# Patient Record
Sex: FEMALE
[De-identification: names, ages, dates, MRNs, and addresses within clinical notes are randomized per-mention and may not be internally consistent; named-entity substitution may affect disease eponyms.]

---

## 2021-03-12 ENCOUNTER — HOSPITAL ENCOUNTER (EMERGENCY)
Dept: HOSPITAL 97 - ER | Age: 26
Discharge: HOME | End: 2021-03-12
Payer: SELF-PAY

## 2021-03-12 VITALS — SYSTOLIC BLOOD PRESSURE: 120 MMHG | DIASTOLIC BLOOD PRESSURE: 78 MMHG

## 2021-03-12 VITALS — OXYGEN SATURATION: 98 % | TEMPERATURE: 98.5 F

## 2021-03-12 DIAGNOSIS — F17.210: ICD-10-CM

## 2021-03-12 DIAGNOSIS — F41.9: ICD-10-CM

## 2021-03-12 DIAGNOSIS — K29.70: Primary | ICD-10-CM

## 2021-03-12 LAB
ALBUMIN SERPL BCP-MCNC: 4.5 G/DL (ref 3.4–5)
ALP SERPL-CCNC: 82 U/L (ref 45–117)
ALT SERPL W P-5'-P-CCNC: 20 U/L (ref 12–78)
AST SERPL W P-5'-P-CCNC: 22 U/L (ref 15–37)
BUN BLD-MCNC: 14 MG/DL (ref 7–18)
GLUCOSE SERPLBLD-MCNC: 76 MG/DL (ref 74–106)
HCT VFR BLD CALC: 38.9 % (ref 36–45)
LIPASE SERPL-CCNC: 57 U/L (ref 73–393)
LYMPHOCYTES # SPEC AUTO: 1.5 K/UL (ref 0.7–4.9)
PMV BLD: 8 FL (ref 7.6–11.3)
POTASSIUM SERPL-SCNC: 3.8 MMOL/L (ref 3.5–5.1)
RBC # BLD: 4.46 M/UL (ref 3.86–4.86)

## 2021-03-12 PROCEDURE — 80076 HEPATIC FUNCTION PANEL: CPT

## 2021-03-12 PROCEDURE — 99284 EMERGENCY DEPT VISIT MOD MDM: CPT

## 2021-03-12 PROCEDURE — 80048 BASIC METABOLIC PNL TOTAL CA: CPT

## 2021-03-12 PROCEDURE — 83690 ASSAY OF LIPASE: CPT

## 2021-03-12 PROCEDURE — 36415 COLL VENOUS BLD VENIPUNCTURE: CPT

## 2021-03-12 PROCEDURE — 85025 COMPLETE CBC W/AUTO DIFF WBC: CPT

## 2021-03-12 NOTE — ER
Nurse's Notes                                                                                     

 [facility]                                                                 

Name: Sierra Doss                                                                                

Age: 25 yrs                                                                                       

Sex: Female                                                                                       

: 1995                                                                                   

MRN: [id number]                                                                                   

Arrival Date: 2021                                                                          

Time: 19:45                                                                                       

Account#: [id number]                                                                            

Bed 16                                                                                            

Private MD:                                                                                       

Diagnosis: Nausea and vomiting;Anxiety disorder, unspecified;Gastritis                            

                                                                                                  

Presentation:                                                                                     

                                                                                             

19:48 Coronavirus screen: Client denies travel out of the [place] in the last 14 days. At this   ll1 

      time, the client does not indicate any symptoms associated with coronavirus-19. Ebola       

      Screen: Patient denies travel to an Ebola-affected area in the 21 days before illness       

      onset. Initial Sepsis Screen: Does the patient meet any 2 criteria? No. Patient's           

      initial sepsis screen is negative. Does the patient have a suspected source of              

      infection? Yes: Acute abdominal pain. Risk Assessment: Do you want to hurt yourself or      

      someone else? Patient reports no desire to harm self or others. Onset of symptoms was       

      2021.                                                                             

19:48 Method Of Arrival: Ambulatory                                                           ll1 

19:48 Acuity: VICKY 2                                                                           ll1 

19:51 Chief complaint: Patient states: Sever abd pain with N/V for 1 day. Vomiting bright and ll1 

      dark red blood today.                                                                       

                                                                                                  

Historical:                                                                                       

- Allergies:                                                                                      

19:51 PENICILLINS;                                                                            ll1 

- PMHx:                                                                                           

19:51 chronic gastroenteritis; Seizures;                                                      ll1 

                                                                                                  

- Immunization history:: Flu vaccine is not up to date.                                           

- Social history:: Smoking status: Patient reports the use of cigarette tobacco                   

  products, smokes one pack cigarettes per day.                                                   

                                                                                                  

                                                                                                  

Screenin:00 Abuse screen: Denies threats or abuse. Nutritional screening: No deficits noted.        jb4 

      Tuberculosis screening: No symptoms or risk factors identified. Fall Risk None              

      identified.                                                                                 

                                                                                                  

Assessment:                                                                                       

20:00 General: Appears in no apparent distress. uncomfortable, Behavior is calm, cooperative, jb4 

      appropriate for age. Pain: Complains of pain in abdomen Pain does not radiate. Pain         

      currently is 8 out of 10 on a pain scale. Neuro: Level of Consciousness is awake,           

      alert, obeys commands, Oriented to person, place, time, situation. Cardiovascular:          

      Patient's skin is warm and dry. Respiratory: Airway is patent Respiratory effort is         

      even, unlabored, Respiratory pattern is regular, symmetrical. GI: Abdomen is flat,          

      non-distended. : No signs and/or symptoms were reported regarding the genitourinary       

      system. EENT: No signs and/or symptoms were reported regarding the EENT system. Derm:       

      Skin is intact, Skin is pink, warm \T\ dry. Musculoskeletal: Circulation, motion, and       

      sensation intact. Range of motion: intact in all extremities.                               

21:00 Reassessment: Patient appears in no apparent distress at this time. Patient and/or      jb4 

      family updated on plan of care and expected duration. Pain level reassessed. Patient is     

      alert, oriented x 3, equal unlabored respirations, skin warm/dry/pink.                      

22:00 Reassessment: Patient appears in no apparent distress at this time. Patient and/or      jb4 

      family updated on plan of care and expected duration. Pain level reassessed. Patient is     

      alert, oriented x 3, equal unlabored respirations, skin warm/dry/pink.                      

                                                                                                  

Vital Signs:                                                                                      

19:48 Pulse 105; Resp 17; Temp 98.5; Pulse Ox 98% ; Weight 65.77 kg; Height 5 ft. 8 in.       ll1 

      (172.72 cm); Pain 8/10;                                                                     

19:51  / 112;                                                                           ll1 

22:00  / 78; Pulse 95; Resp 18; Pulse Ox 98% on R/A; Pain 2/10;                         jb4 

19:48 Body Mass Index 22.05 (65.77 kg, 172.72 cm)                                             ll1 

                                                                                                  

ED Course:                                                                                        

19:45 Patient arrived in ED.                                                                  mr  

19:50 Triage completed.                                                                       ll1 

19:51 Arm band placed on.                                                                     ll1 

19:54 [facility]                                                               [id number] 

19:54 Harinder Santiago MD is Attending Physician.                                                [id number] 

20:00 Patient has correct armband on for positive identification. Bed in low position. Call   jb4 

      light in reach. Side rails up X 1. Pulse ox on. NIBP on.                                    

20:37 Wero, Tony, RN is Primary Nurse.                                                     jb4 

21:00 Initial lab(s) drawn, by me, sent to lab. Inserted saline lock: 18 gauge in left        jb4 

      antecubital area, using aseptic technique. Blood collected.                                 

22:47 No provider procedures requiring assistance completed. IV discontinued, intact,         jb4 

      bleeding controlled, No redness/swelling at site.                                           

                                                                                                  

Administered Medications:                                                                         

21:05 Drug: NS 0.9% 1000 ml Route: IV; Rate: 1000 ml; Site: left antecubital;                 jb4 

22:00 Follow up: Response: No adverse reaction; IV Status: Completed infusion; IV Intake:     jb4 

      1000ml                                                                                      

21:08 Drug: Promethazine 12.5 mg Route: IVP; Site: left antecubital;                          jb4 

21:30 Follow up: Response: No adverse reaction; Marked relief of symptoms                     jb4 

21:12 Drug: ProTONIX 40 mg Route: IVP; Site: left femoral;                                    jb4 

21:40 Follow up: Response: No adverse reaction                                                jb4 

21:13 Drug: Ativan 0.5 mg Route: IVP; Site: left antecubital;                                 jb4 

21:45 Follow up: Response: No adverse reaction; Marked relief of symptoms                     jb4 

                                                                                                  

                                                                                                  

Intake:                                                                                           

22:00 IV: 1000ml; Total: 1000ml.                                                              jb4 

                                                                                                  

Outcome:                                                                                          

22:20 Discharge ordered by MD.                                                                samir 

22:47 Discharged to home ambulatory.                                                          jb4 

22:47 Condition: stable                                                                           

22:47 Discharge instructions given to patient, Instructed on discharge instructions, follow       

      up and referral plans. medication usage, Demonstrated understanding of instructions,        

      follow-up care, medications, Prescriptions given X 1.                                       

22:49 Patient left the ED.                                                                    jb4 

                                                                                                  

Signatures:                                                                                       

Weiss, Luz Marina                                 mr                                                   

Arnoldo Collazo PA PA   jr8                                                  

Tony Conteh RN                       RN   jb4                                                  

Augusta Mendoza RN                       RN   ll1                                                  

                                                                                                  

Corrections: (The following items were deleted from the chart)                                    

19:52 19:48 Acuity: VICKY 3 ll1                                                                 ll1 

                                                                                                  

**************************************************************************************************

## 2021-03-12 NOTE — EDPHYS
Physician Documentation                                                                           

 [facility]                                                                 

Name: Sierra Doss                                                                                

Age: 25 yrs                                                                                       

Sex: Female                                                                                       

: 1995                                                                                   

MRN: [id number]                                                                                   

Arrival Date: 2021                                                                          

Time: 19:45                                                                                       

Account#: [id number]                                                                            

Bed 16                                                                                            

Private MD:                                                                                       

ED Physician Harinder Santiago                                                                         

HPI:                                                                                              

                                                                                             

20:07 This 25 yrs old  Female presents to ER via Ambulatory with complaints of        jr8 

      Abdominal Pain, Vomiting.                                                                   

20:07 The patient presents with abdominal pain in the epigastric area. Onset: The             jr8 

      symptoms/episode began/occurred today. Associated signs and symptoms: Pertinent             

      positives: vomiting. Patient reports epigastric pain and vomiting that started today at     

      5pm. PMHx: chronic gastroenteritis and anxiety. She reports having had similar episodes     

      before that were treated with Phenergan. She denies diarrhea and reports constipation. .    

                                                                                                  

Historical:                                                                                       

- Allergies:                                                                                      

19:51 PENICILLINS;                                                                            ll1 

- PMHx:                                                                                           

19:51 chronic gastroenteritis; Seizures;                                                      ll1 

                                                                                                  

- Immunization history:: Flu vaccine is not up to date.                                           

- Social history:: Smoking status: Patient reports the use of cigarette tobacco                   

  products, smokes one pack cigarettes per day.                                                   

                                                                                                  

                                                                                                  

ROS:                                                                                              

20:10 Cardiovascular: Negative for chest pain, palpitations, and edema, Respiratory: Negative jr8 

      for shortness of breath, cough, wheezing, and pleuritic chest pain, MS/Extremity:           

      Negative for injury and deformity, Skin: Negative for injury, rash, and discoloration,      

      Neuro: Negative for headache, weakness, numbness, tingling, and seizure.                    

20:10 Abdomen/GI: Positive for abdominal pain, vomiting.                                          

                                                                                                  

Exam:                                                                                             

20:11 Head/Face:  Normocephalic, atraumatic. Chest/axilla:  Normal chest wall appearance and  jr8 

      motion.  Nontender with no deformity.  No lesions are appreciated. Cardiovascular:          

      Regular rate and rhythm with a normal S1 and S2.  No gallops, murmurs, or rubs.  Normal     

      PMI, no JVD.  No pulse deficits. Respiratory:  Lungs have equal breath sounds               

      bilaterally, clear to auscultation and percussion.  No rales, rhonchi or wheezes noted.     

       No increased work of breathing, no retractions or nasal flaring. MS/ Extremity:            

      Pulses equal, no cyanosis.  Neurovascular intact.  Full, normal range of motion. Neuro:     

       Awake and alert, GCS 15, oriented to person, place, time, and situation.  Cranial          

      nerves II-XII grossly intact.  Motor strength 5/5 in all extremities.  Sensory grossly      

      intact.  Cerebellar exam normal.  Normal gait.                                              

20:11 Abdomen/GI: Inspection: abdomen appears normal, Bowel sounds: normal, in all quadrants,     

      Palpation: mild abdominal tenderness, in the suprapubic area, Indicators: McBurney's        

      point is not tender, Montero's sign is negative, Rovsing's sign is negative, Psoas sign      

      is negative.                                                                                

                                                                                                  

Vital Signs:                                                                                      

19:48 Pulse 105; Resp 17; Temp 98.5; Pulse Ox 98% ; Weight 65.77 kg; Height 5 ft. 8 in.       ll1 

      (172.72 cm); Pain 8/10;                                                                     

19:51  / 112;                                                                           ll1 

22:00  / 78; Pulse 95; Resp 18; Pulse Ox 98% on R/A; Pain 2/10;                         jb4 

19:48 Body Mass Index 22.05 (65.77 kg, 172.72 cm)                                             ll1 

                                                                                                  

MDM:                                                                                              

19:54 Patient medically screened.                                                             [facility] 

20:13 Data reviewed: vital signs, nurses notes, lab test result(s).                           8 

20:13 Data interpreted: Cardiac monitor: rate is 105 beats/min, rhythm is regular, Pulse      8 

      oximetry: on room air is 98 %. Interpretation: normal.                                      

22:15 Counseling: I had a detailed discussion with the patient and/or guardian regarding:     jr8 

      Patient did not want to provide urine specimen. Educated on importance to aid in            

      diagnosis. She still declined. Educated to return if issues worsen. .                       

22:16 Data reviewed:. Counseling: I had a detailed discussion with the patient and/or         jr8 

      guardian regarding: the historical points, exam findings, and any diagnostic results        

      supporting the discharge/admit diagnosis, lab results.                                      

                                                                                                  

                                                                                             

20:04 Order name: Basic Metabolic Panel; Complete Time: 22:03                                 [facility] 

                                                                                             

20:04 Order name: CBC with Diff; Complete Time: 22:03                                         [facility] 

                                                                                             

20:04 Order name: Hepatic Function; Complete Time: 22:03                                      [facility] 

                                                                                             

20:04 Order name: Lipase; Complete Time: 22:03                                                [facility] 

                                                                                             

20:04 Order name: IV Saline Lock; Complete Time: 21:31                                        [facility] 

                                                                                             

20:04 Order name: Labs collected and sent; Complete Time: 21:31                               jr8 

                                                                                                  

Administered Medications:                                                                         

21:05 Drug: NS 0.9% 1000 ml Route: IV; Rate: 1000 ml; Site: left antecubital;                 jb4 

22:00 Follow up: Response: No adverse reaction; IV Status: Completed infusion; IV Intake:     jb4 

      1000ml                                                                                      

21:08 Drug: Promethazine 12.5 mg Route: IVP; Site: left antecubital;                          jb4 

21:30 Follow up: Response: No adverse reaction; Marked relief of symptoms                     jb4 

21:12 Drug: ProTONIX 40 mg Route: IVP; Site: left femoral;                                    jb4 

21:40 Follow up: Response: No adverse reaction                                                jb4 

21:13 Drug: Ativan 0.5 mg Route: IVP; Site: left antecubital;                                 jb4 

21:45 Follow up: Response: No adverse reaction; Marked relief of symptoms                     jb4 

                                                                                                  

                                                                                                  

Disposition:                                                                                      

23:07 Co-signature as Attending Physician, Harinder Santiago MD.                                    rn  

                                                                                                  

Disposition:                                                                                      

21 22:20 Discharged to Home. Impression: Nausea and vomiting, Anxiety disorder,             

  unspecified, Gastritis.                                                                         

- Condition is Stable.                                                                            

- Discharge Instructions: Panic Attacks, Nausea and Vomiting, Adult, Generalized                  

  Anxiety Disorder.                                                                               

- Prescriptions for promethazine 25 mg Oral Tablet - take 1 tablet by ORAL route every            

  6 hours As needed; 20 tablet.                                                                   

- Medication Reconciliation Form, Thank You Letter, Antibiotic Education, Prescription            

  Opioid Use form.                                                                                

- Follow up: Private Physician; When: 5 - 6 days; Reason: Recheck today's complaints,             

  Continuance of care, Re-evaluation by your physician.                                           

- Problem is new.                                                                                 

- Symptoms have improved.                                                                         

                                                                                                  

                                                                                                  

                                                                                                  

Signatures:                                                                                       

Dispatcher [employer]                           EDMS                                                 

Harinder Santiago MD MD rn Roszak, Josh, PA PA   jr8                                                  

Tony Conteh RN                       RN   jb4                                                  

Augusta Mendoza RN                       RN   ll1                                                  

                                                                                                  

Corrections: (The following items were deleted from the chart)                                    

21: 20:06 Urine Dipstick-Ancillary ordered. 8                                              

: 20:06 Urine Pregnancy Test ordered. 8                                                 [facility] 

22:19 20:13 Data interpreted: Cardiac monitor: rate is 105 beats/min, rhythm is regular,      jr8 

      Pulse oximetry: on room air is 98 %. Interpretation: normal. 8                            

22:49 22:20 2021 22:20 Discharged to Home. Impression: Nausea and vomiting; Anxiety     jb4 

      disorder, unspecified; Gastritis. Condition is Stable. Forms are Medication                 

      Reconciliation Form, Thank You Letter, Antibiotic Education, Prescription Opioid Use.       

      Follow up: Private Physician; When: 5 - 6 days; Reason: Recheck today's complaints,         

      Continuance of care, Re-evaluation by your physician. Problem is new. Symptoms have         

      improved. jr8                                                                               

                                                                                                  

**************************************************************************************************

## 2021-04-12 ENCOUNTER — HOSPITAL ENCOUNTER (EMERGENCY)
Dept: HOSPITAL 97 - ER | Age: 26
Discharge: HOME | End: 2021-04-12
Payer: SELF-PAY

## 2021-04-12 VITALS — DIASTOLIC BLOOD PRESSURE: 75 MMHG | OXYGEN SATURATION: 100 % | SYSTOLIC BLOOD PRESSURE: 119 MMHG

## 2021-04-12 VITALS — TEMPERATURE: 98.2 F

## 2021-04-12 DIAGNOSIS — K59.00: Primary | ICD-10-CM

## 2021-04-12 DIAGNOSIS — Z88.0: ICD-10-CM

## 2021-04-12 DIAGNOSIS — F17.210: ICD-10-CM

## 2021-04-12 LAB
ALBUMIN SERPL BCP-MCNC: 4 G/DL (ref 3.4–5)
ALP SERPL-CCNC: 82 U/L (ref 45–117)
ALT SERPL W P-5'-P-CCNC: 15 U/L (ref 12–78)
AST SERPL W P-5'-P-CCNC: 10 U/L (ref 15–37)
BLD SMEAR INTERP: (no result)
BUN BLD-MCNC: 8 MG/DL (ref 7–18)
GLUCOSE SERPLBLD-MCNC: 115 MG/DL (ref 74–106)
HCT VFR BLD CALC: 40.3 % (ref 36–45)
LIPASE SERPL-CCNC: 65 U/L (ref 73–393)
LYMPHOCYTES # SPEC AUTO: 0.6 K/UL (ref 0.7–4.9)
MORPHOLOGY BLD-IMP: (no result)
PMV BLD: 8 FL (ref 7.6–11.3)
POTASSIUM SERPL-SCNC: 3.9 MMOL/L (ref 3.5–5.1)
RBC # BLD: 4.6 M/UL (ref 3.86–4.86)
SP GR UR: >1.03 (ref 1–1.03)

## 2021-04-12 PROCEDURE — 36415 COLL VENOUS BLD VENIPUNCTURE: CPT

## 2021-04-12 PROCEDURE — 96375 TX/PRO/DX INJ NEW DRUG ADDON: CPT

## 2021-04-12 PROCEDURE — 85025 COMPLETE CBC W/AUTO DIFF WBC: CPT

## 2021-04-12 PROCEDURE — 74018 RADEX ABDOMEN 1 VIEW: CPT

## 2021-04-12 PROCEDURE — 80076 HEPATIC FUNCTION PANEL: CPT

## 2021-04-12 PROCEDURE — 99284 EMERGENCY DEPT VISIT MOD MDM: CPT

## 2021-04-12 PROCEDURE — 81025 URINE PREGNANCY TEST: CPT

## 2021-04-12 PROCEDURE — 96374 THER/PROPH/DIAG INJ IV PUSH: CPT

## 2021-04-12 PROCEDURE — 80048 BASIC METABOLIC PNL TOTAL CA: CPT

## 2021-04-12 PROCEDURE — 81003 URINALYSIS AUTO W/O SCOPE: CPT

## 2021-04-12 PROCEDURE — 96361 HYDRATE IV INFUSION ADD-ON: CPT

## 2021-04-12 PROCEDURE — 83690 ASSAY OF LIPASE: CPT

## 2021-04-12 NOTE — EDPHYS
Physician Documentation                                                                           

 [facility]                                                                 

Name: Sierra Doss                                                                                

Age: 26 yrs                                                                                       

Sex: Female                                                                                       

: 1995                                                                                   

MRN: [id number]                                                                                   

Arrival Date: 2021                                                                          

Time: 05:00                                                                                       

Account#: [id number]                                                                            

Bed 5                                                                                             

Private MD:                                                                                       

ED Physician Elodia Meza                                                                    

HPI:                                                                                              

                                                                                             

06:16 This 26 yrs old  Female presents to ER via Ambulatory with complaints of        ma2 

      Constipation.                                                                               

06:16 Onset: The symptoms/episode began/occurred gradually, 3 year(s) ago. Associated signs   ma2 

      and symptoms: Pertinent negatives: anorexia, chest pain, dysuria, headache. Severity of     

      pain: At its worst the pain was moderate in the emergency department the pain is            

      unchanged. The patient has experienced similar episodes in the past.                        

                                                                                                  

OB/GYN:                                                                                           

05:15 LMP 3/2021                                                                                

                                                                                                  

Historical:                                                                                       

- Allergies:                                                                                      

05:13 PENICILLINS;                                                                            wh  

- PMHx:                                                                                           

05:13 chronic gastroenteritis; Seizures;                                                      wh  

                                                                                                  

- Immunization history:: Adult Immunizations up to date.                                          

- Social history:: Smoking status: Patient reports the use of cigarette tobacco                   

  products, smokes one-half pack cigarettes per day, Patient uses street drugs,                   

  marijuana, Patient/guardian denies using alcohol, street drugs, The patient lives               

  with family.                                                                                    

- Family history:: not pertinent.                                                                 

                                                                                                  

                                                                                                  

ROS:                                                                                              

06:16 Constitutional: Negative for fever, chills, and weight loss.                            ma2 

06:16 All other systems are negative.                                                             

                                                                                                  

Exam:                                                                                             

06:16 Constitutional:  This is a well developed, well nourished patient who is awake, alert,  ma2 

      and in no acute distress. Head/Face:  Normocephalic, atraumatic. Eyes:  Pupils equal        

      round and reactive to light, extra-ocular motions intact.  Lids and lashes normal.          

      Conjunctiva and sclera are non-icteric and not injected.  Cornea within normal limits.      

      Periorbital areas with no swelling, redness, or edema. ENT:  Nares patent. No nasal         

      discharge, no septal abnormalities noted.  Tympanic membranes are normal and external       

      auditory canals are clear.  Oropharynx with no redness, swelling, or masses, exudates,      

      or evidence of obstruction, uvula midline.  Mucous membranes moist. Neck:  Trachea          

      midline, no thyromegaly or masses palpated, and no cervical lymphadenopathy.  Supple,       

      full range of motion without nuchal rigidity, or vertebral point tenderness.  No            

      Meningismus. Chest/axilla:  Normal chest wall appearance and motion.  Nontender with no     

      deformity.  No lesions are appreciated. Cardiovascular:  Regular rate and rhythm with a     

      normal S1 and S2.  No gallops, murmurs, or rubs.  Normal PMI, no JVD.  No pulse             

      deficits. Respiratory:  Lungs have equal breath sounds bilaterally, clear to                

      auscultation and percussion.  No rales, rhonchi or wheezes noted.  No increased work of     

      breathing, no retractions or nasal flaring. Abdomen/GI:  Soft, non-tender, with normal      

      bowel sounds.  No distension or tympany.  No guarding or rebound.  No evidence of           

      tenderness throughout. Back:  No spinal tenderness.  No costovertebral tenderness.          

      Full range of motion. Skin:  Warm, dry with normal turgor.  Normal color with no            

      rashes, no lesions, and no evidence of cellulitis. MS/ Extremity:  Pulses equal, no         

      cyanosis.  Neurovascular intact.  Full, normal range of motion. Neuro:  Awake and           

      alert, GCS 15, oriented to person, place, time, and situation.  Cranial nerves II-XII       

      grossly intact.  Motor strength 5/5 in all extremities.  Sensory grossly intact.            

      Cerebellar exam normal.  Normal gait.                                                       

                                                                                                  

Vital Signs:                                                                                      

05:11  / 73; Pulse 99; Resp 18; Temp 98.2; Pulse Ox 99% ; Weight 65.77 kg; Height 5 ft. wh  

      8 in. (172.72 cm); Pain 7/10;                                                               

07:00  / 75; Pulse 74; Resp 15; Pulse Ox 100% ;                                         jl7 

05:11 Body Mass Index 22.05 (65.77 kg, 172.72 cm)                                               

                                                                                                  

MDM:                                                                                              

05:19 Patient medically screened.                                                             ma2 

06:16 Differential diagnosis: gastritis, gastroesophageal reflux disease, Irritable bowel     ma2 

      syndrome, non-specific abd pain.                                                            

07:04 Data reviewed: vital signs, nurses notes. Counseling: I had a detailed discussion with  ma2 

      the patient and/or guardian regarding: the historical points, exam findings, and any        

      diagnostic results supporting the discharge/admit diagnosis, the presence of at least       

      one elevated blood pressure reading (>120/80) during this emergency department visit,       

      the need for outpatient follow up. Response to treatment: the patient's symptoms have       

      markedly improved after treatment.                                                          

                                                                                                  

                                                                                             

05:31 Order name: Basic Metabolic Panel; Complete Time: 06:20                                 ma2 

                                                                                             

05:31 Order name: CBC with Diff                                                               ma2 

                                                                                             

05:31 Order name: Hepatic Function; Complete Time: 06:20                                      ma2 

                                                                                             

05:31 Order name: Lipase; Complete Time: 06:20                                                ma2 

                                                                                             

05:48 Order name: Urine Dipstick-Ancillary; Complete Time: 06:12                              EDMS

                                                                                             

05:50 Order name: Urine Pregnancy--Ancillary (enter results); Complete Time: 06:12            mw2 

                                                                                             

05:31 Order name: IV Saline Lock; Complete Time: 05:50                                        ma2 

                                                                                             

05:33 Order name: Abdomen 1 View XRAY                                                         ma2 

                                                                                             

06:06 Order name: CBC Smear Scan                                                              EDMS

                                                                                             

05:31 Order name: Labs collected and sent; Complete Time: 05:50                               ma2 

                                                                                             

05:31 Order name: Urine Dipstick-Ancillary (obtain specimen); Complete Time: 05:50            ma2 

                                                                                                  

Administered Medications:                                                                         

05:45 Drug: NS 0.9% 1000 ml Route: IV; Rate: 1 bolus; Site: right antecubital;                  

07:00 Follow up: Response: No adverse reaction; IV Status: Completed infusion; IV Intake:     jl7 

      1000ml                                                                                      

05:47 Drug: Zofran (Ondansetron) 4 mg Route: IVP; Site: right antecubital;                      

07:19 Follow up: Response: No adverse reaction                                                jl7 

05:49 Drug: Pepcid (famotidine) 20 mg Route: IVP; Site: right antecubital;                      

07:19 Follow up: Response: No adverse reaction                                                jl7 

05:50 Drug: Fleet Enema 133 ml {Note: Administered by Anna ALEXIS.} Route: MN;                    

07:20 Follow up: Response: No adverse reaction                                                jl7 

07:13 Drug: TORadol (ketorolac) 30 mg Route: IVP; Site: right antecubital;                    [facility] 

07:18 Follow up: Response: No adverse reaction                                                jl7 

                                                                                                  

                                                                                                  

Disposition:                                                                                      

21 07:05 Discharged to Home. Impression: Constipation, unspecified.                         

- Condition is Stable.                                                                            

- Discharge Instructions: Constipation, Adult, High-Fiber Diet, Constipation, Adult,              

  Easy-to-Read.                                                                                   

- Prescriptions for Fleet Enema Extra - insert 1 Cartridge by RECTAL route 2-4 times              

  daily for 5 days; 5 Cartridge. Colace 100 mg Oral Tablet - take 1 tablet by ORAL                

  route every 12 hours; 14 tablet.                                                                

- Medication Reconciliation Form, Thank You Letter, Antibiotic Education, Prescription            

  Opioid Use form.                                                                                

- Follow up: Orion Hernandez; When: Tomorrow; Reason: If symptoms return, Continuance of           

  care.                                                                                           

                                                                                                  

                                                                                                  

                                                                                                  

Signatures:                                                                                       

Dispatcher [employer]                           Malu Liao RN RN   jl7                                                  

Princess Alcaraz RN RN                                                      

Elodia Meza MD MD   ma2                                                  

                                                                                                  

Corrections: (The following items were deleted from the chart)                                    

07:21 07:05 2021 07:05 Discharged to Home. Impression: Constipation, unspecified.       jl7 

      Condition is Stable. Discharge Instructions: Constipation, Adult, High-Fiber Diet,          

      Constipation, Adult, Easy-to-Read. Prescriptions for Fleet Enema Extra - insert 1           

      Cartridge by RECTAL route 2-4 times daily for 5 days; 5 Cartridge, Colace 100 mg Oral       

      Tablet - take 1 tablet by ORAL route every 12 hours; 14 tablet. and Forms are               

      Medication Reconciliation Form, Thank You Letter, Antibiotic Education, Prescription        

      Opioid Use. Follow up: Orion Hernandez; When: Tomorrow; Reason: If symptoms return,          

      Continuance of care. ma2                                                                    

                                                                                                  

**************************************************************************************************

## 2021-04-12 NOTE — RAD REPORT
EXAM DESCRIPTION:  RAD - Abdomen Single View - 4/12/2021 6:54 am

 

CLINICAL HISTORY:  Abdomen pain.

 

FINDINGS:  The bowel gas pattern is unremarkable.

 

A moderate amount of stool is present throughout colon.

 

No abnormal calcifications displayed

## 2021-04-12 NOTE — ER
Nurse's Notes                                                                                     

 [facility]                                                                 

Name: Sierra Doss                                                                                

Age: 26 yrs                                                                                       

Sex: Female                                                                                       

: 1995                                                                                   

MRN: [id number]                                                                                   

Arrival Date: 2021                                                                          

Time: 05:00                                                                                       

Account#: [id number]                                                                            

Bed 5                                                                                             

Private MD:                                                                                       

Diagnosis: Constipation, unspecified                                                              

                                                                                                  

Presentation:                                                                                     

                                                                                             

05:11 Chief complaint: Patient states: abdominal pain and constipation for a week.              

      Coronavirus screen: Client denies travel out of the [place] in the last 14 days. At this       

      time, the client does not indicate any symptoms associated with coronavirus-19. Ebola       

      Screen: Patient negative for fever greater than or equal to 101.5 degrees Fahrenheit,       

      and additional compatible Ebola Virus Disease symptoms Patient denies exposure to           

      infectious person. Initial Sepsis Screen: Does the patient meet any 2 criteria? HR > 90     

      bpm. Does the patient have a suspected source of infection? Yes: Acute abdominal pain.      

      Risk Assessment: Do you want to hurt yourself or someone else? Patient reports no           

      desire to harm self or others. Onset of symptoms was 2021.                        

05:11 Method Of Arrival: Ambulatory                                                             

05:11 Acuity: VICKY 3                                                                             

                                                                                                  

OB/GYN:                                                                                           

05:15 LMP 3/2021                                                                                

                                                                                                  

Historical:                                                                                       

- Allergies:                                                                                      

05:13 PENICILLINS;                                                                              

- PMHx:                                                                                           

05:13 chronic gastroenteritis; Seizures;                                                        

                                                                                                  

- Immunization history:: Adult Immunizations up to date.                                          

- Social history:: Smoking status: Patient reports the use of cigarette tobacco                   

  products, smokes one-half pack cigarettes per day, Patient uses street drugs,                   

  marijuana, Patient/guardian denies using alcohol, street drugs, The patient lives               

  with family.                                                                                    

- Family history:: not pertinent.                                                                 

                                                                                                  

                                                                                                  

Screenin:13 Abuse screen: Denies threats or abuse. Denies injuries from another. Nutritional          

      screening: No deficits noted. Tuberculosis screening: No symptoms or risk factors           

      identified. Fall Risk None identified.                                                      

                                                                                                  

Assessment:                                                                                       

05:14 General: Appears in no apparent distress. uncomfortable, Behavior is calm, cooperative, wh  

      appropriate for age. Pain: Complains of pain in abdomen Pain radiates to right leg and      

      left leg Quality of pain is described as crampy, Pain began suddenly. Neuro: Level of       

      Consciousness is awake, alert, obeys commands, Oriented to person, place, time,             

      situation, Appropriate for age. Cardiovascular: Heart tones S1 S2. Respiratory: Airway      

      is patent Respiratory effort is even, unlabored, Respiratory pattern is regular,            

      symmetrical, Breath sounds are clear bilaterally. GI: Abdomen is flat, non-distended,       

      Bowel sounds present X 4 quads. Abd is soft and non tender X 4 quads. Reports lower         

      abdominal pain, upper abdominal pain, constipation, cramping. : No signs and/or           

      symptoms were reported regarding the genitourinary system. EENT: No signs and/or            

      symptoms were reported regarding the EENT system. Derm: Skin is intact, is healthy with     

      good turgor, Skin is pink, warm \T\ dry. normal. Musculoskeletal: Circulation, motion,      

      and sensation intact.                                                                       

07:00 Reassessment: Patient appears in no apparent distress at this time. Patient and/or      jl7 

      family updated on plan of care and expected duration. Pain level reassessed. Patient is     

      alert, oriented x 3, equal unlabored respirations, skin warm/dry/pink.                      

                                                                                                  

Vital Signs:                                                                                      

05:11  / 73; Pulse 99; Resp 18; Temp 98.2; Pulse Ox 99% ; Weight 65.77 kg; Height 5 ft.   

      8 in. (172.72 cm); Pain 7/10;                                                               

07:00  / 75; Pulse 74; Resp 15; Pulse Ox 100% ;                                         jl7 

05:11 Body Mass Index 22.05 (65.77 kg, 172.72 cm)                                               

                                                                                                  

ED Course:                                                                                        

05:00 Patient arrived in ED.                                                                  cl3 

05:02 Princess Alcaraz RN is Primary Nurse.                                                       

05:13 Triage completed.                                                                         

05:13 Patient has correct armband on for positive identification. Bed in low position. Call     

      light in reach. Side rails up X 1. Pulse ox on. NIBP on.                                    

05:15 Arm band placed on right wrist.                                                           

05:19 Elodia Meza MD is Attending Physician.                                           ma2 

05:45 Primary Nurse role handed off by Princess Alcaraz RN                                      mw2 

05:45 Inserted saline lock: 20 gauge in right antecubital area, using aseptic technique.        

      Blood collected.                                                                            

05:51 Habalo, Winsy, RN is Primary Nurse.                                                       

06:54 Abdomen 1 View XRAY In Process Unspecified.                                             EDMS

07:05 Orion Hernandez MD is Referral Physician.                                              ma2 

07:20 No provider procedures requiring assistance completed. IV discontinued, intact,         jl7 

      bleeding controlled, No redness/swelling at site. Pressure dressing applied.                

                                                                                                  

Administered Medications:                                                                         

05:45 Drug: NS 0.9% 1000 ml Route: IV; Rate: 1 bolus; Site: right antecubital;                  

07:00 Follow up: Response: No adverse reaction; IV Status: Completed infusion; IV Intake:     jl7 

      1000ml                                                                                      

05:47 Drug: Zofran (Ondansetron) 4 mg Route: IVP; Site: right antecubital;                      

07:19 Follow up: Response: No adverse reaction                                                jl7 

05:49 Drug: Pepcid (famotidine) 20 mg Route: IVP; Site: right antecubital;                      

07:19 Follow up: Response: No adverse reaction                                                7 

05:50 Drug: Fleet Enema 133 ml {Note: Administered by Anna ALEXIS.} Route: OK;                    

07:20 Follow up: Response: No adverse reaction                                                jl7 

07:13 Drug: TORadol (ketorolac) 30 mg Route: IVP; Site: right antecubital;                    [facility] 

07:18 Follow up: Response: No adverse reaction                                                jl7 

                                                                                                  

                                                                                                  

Intake:                                                                                           

07:00 IV: 1000ml; Total: 1000ml.                                                              jl7 

                                                                                                  

Outcome:                                                                                          

07:05 Discharge ordered by MD.                                                                ma2 

07:20 Discharged to home ambulatory.                                                          7 

07:20 Condition: stable                                                                           

07:20 Discharge instructions given to patient, Instructed on discharge instructions, follow       

      up and referral plans. medication usage, Demonstrated understanding of instructions,        

      follow-up care, medications, Prescriptions given X 2.                                       

07:21 Patient left the ED.                                                                    jl7 

                                                                                                  

Signatures:                                                                                       

Dispatcher [employer]                           EDMS                                                 

Malu Cortez RN RN   jl7                                                  

Princess Alcaraz RN RN                                                      

Elodia Meza MD MD ma2 [place], MyKena mw2 Lewis, Charde cl3                                                  

                                                                                                  

**************************************************************************************************

## 2021-10-19 ENCOUNTER — HOSPITAL ENCOUNTER (EMERGENCY)
Dept: HOSPITAL 97 - ER | Age: 26
Discharge: HOME | End: 2021-10-19
Payer: SELF-PAY

## 2021-10-19 VITALS — TEMPERATURE: 98.5 F | OXYGEN SATURATION: 100 %

## 2021-10-19 VITALS — SYSTOLIC BLOOD PRESSURE: 109 MMHG | DIASTOLIC BLOOD PRESSURE: 68 MMHG

## 2021-10-19 DIAGNOSIS — Z88.0: ICD-10-CM

## 2021-10-19 DIAGNOSIS — R11.0: Primary | ICD-10-CM

## 2021-10-19 LAB
ALBUMIN SERPL BCP-MCNC: 4.6 G/DL (ref 3.4–5)
ALP SERPL-CCNC: 89 U/L (ref 45–117)
ALT SERPL W P-5'-P-CCNC: 23 U/L (ref 12–78)
AST SERPL W P-5'-P-CCNC: 19 U/L (ref 15–37)
BUN BLD-MCNC: 12 MG/DL (ref 7–18)
GLUCOSE SERPLBLD-MCNC: 91 MG/DL (ref 74–106)
HCT VFR BLD CALC: 41.8 % (ref 36–45)
LIPASE SERPL-CCNC: 65 U/L (ref 73–393)
LYMPHOCYTES # SPEC AUTO: 1.8 K/UL (ref 0.7–4.9)
PMV BLD: 7.4 FL (ref 7.6–11.3)
POTASSIUM SERPL-SCNC: 4.1 MMOL/L (ref 3.5–5.1)
RBC # BLD: 4.79 M/UL (ref 3.86–4.86)
SP GR UR: 1.02 (ref 1–1.03)

## 2021-10-19 PROCEDURE — 81025 URINE PREGNANCY TEST: CPT

## 2021-10-19 PROCEDURE — 80076 HEPATIC FUNCTION PANEL: CPT

## 2021-10-19 PROCEDURE — 96375 TX/PRO/DX INJ NEW DRUG ADDON: CPT

## 2021-10-19 PROCEDURE — 96374 THER/PROPH/DIAG INJ IV PUSH: CPT

## 2021-10-19 PROCEDURE — 83690 ASSAY OF LIPASE: CPT

## 2021-10-19 PROCEDURE — 99284 EMERGENCY DEPT VISIT MOD MDM: CPT

## 2021-10-19 PROCEDURE — 85025 COMPLETE CBC W/AUTO DIFF WBC: CPT

## 2021-10-19 PROCEDURE — 36415 COLL VENOUS BLD VENIPUNCTURE: CPT

## 2021-10-19 PROCEDURE — 81003 URINALYSIS AUTO W/O SCOPE: CPT

## 2021-10-19 PROCEDURE — 80048 BASIC METABOLIC PNL TOTAL CA: CPT

## 2021-10-19 NOTE — EDPHYS
Physician Documentation                                                                           

 [facility]                                                                 

Name: Sierra Doss                                                                                

Age: 26 yrs                                                                                       

Sex: Female                                                                                       

: 1995                                                                                   

MRN: [id number]                                                                                   

Arrival Date: 10/19/2021                                                                          

Time: 11:39                                                                                       

Account#: [id number]                                                                            

Bed 24                                                                                            

Private MD:                                                                                       

ED Physician Elodia Meza                                                                    

HPI:                                                                                              

10/19                                                                                             

12:20 This 26 yrs old  Female presents to ER via Ambulatory with complaints of        ma2 

      Nausea/Vomiting.                                                                            

12:20 The patient presents to the emergency department with nausea, vomiting, diarrhea.       ma2 

      Onset: The symptoms/episode began/occurred gradually, 1 day(s) ago. Associated signs        

      and symptoms: Pertinent negatives: constipation, dysuria, flatulence, GI bleeding,          

      hematuria. Severity of symptoms: At their worst the symptoms were mild in the emergency     

      department the symptoms are unchanged. The patient has experienced similar episodes in      

      the past.                                                                                   

                                                                                                  

OB/GYN:                                                                                           

12:04 LMP 10/11/2021                                                                          vg1 

                                                                                                  

Historical:                                                                                       

- Allergies:                                                                                      

12:04 PENICILLINS;                                                                            vg1 

- Home Meds:                                                                                      

12:04 Prozac Oral [Active]; Propranolol Oral [Active];                                        vg1 

- PMHx:                                                                                           

12:04 Seizures; chronic gastroenteritis;                                                      vg1 

                                                                                                  

- Immunization history:: Adult Immunizations up to date, Client reports having NOT                

  received the Covid vaccine.                                                                     

- Social history:: Smoking status: Patient/guardian denies using tobacco, Stopped _               

  months ago 1.                                                                                   

- Family history:: not pertinent.                                                                 

                                                                                                  

                                                                                                  

ROS:                                                                                              

12:20 Constitutional: Negative for fever, chills, and weight loss.                            ma2 

12:20 All other systems are negative.                                                             

                                                                                                  

Exam:                                                                                             

12:20 Constitutional:  This is a well developed, well nourished patient who is awake, alert,  ma2 

      and in no acute distress. Head/Face:  Normocephalic, atraumatic. Eyes:  Pupils equal        

      round and reactive to light, extra-ocular motions intact.  Lids and lashes normal.          

      Conjunctiva and sclera are non-icteric and not injected.  Cornea within normal limits.      

      Periorbital areas with no swelling, redness, or edema. ENT:  Nares patent. No nasal         

      discharge, no septal abnormalities noted.  Tympanic membranes are normal and external       

      auditory canals are clear.  Oropharynx with no redness, swelling, or masses, exudates,      

      or evidence of obstruction, uvula midline.  Mucous membranes moist. Neck:  Trachea          

      midline, no thyromegaly or masses palpated, and no cervical lymphadenopathy.  Supple,       

      full range of motion without nuchal rigidity, or vertebral point tenderness.  No            

      Meningismus. Chest/axilla:  Normal chest wall appearance and motion.  Nontender with no     

      deformity.  No lesions are appreciated. Cardiovascular:  Regular rate and rhythm with a     

      normal S1 and S2.  No gallops, murmurs, or rubs.  Normal PMI, no JVD.  No pulse             

      deficits. Respiratory:  Lungs have equal breath sounds bilaterally, clear to                

      auscultation and percussion.  No rales, rhonchi or wheezes noted.  No increased work of     

      breathing, no retractions or nasal flaring. Abdomen/GI:  Soft, non-tender, with normal      

      bowel sounds.  No distension or tympany.  No guarding or rebound.  No evidence of           

      tenderness throughout. Skin:  Warm, dry with normal turgor.  Normal color with no           

      rashes, no lesions, and no evidence of cellulitis. MS/ Extremity:  Pulses equal, no         

      cyanosis.  Neurovascular intact.  Full, normal range of motion. Neuro:  Awake and           

      alert, GCS 15, oriented to person, place, time, and situation.  Cranial nerves II-XII       

      grossly intact.  Motor strength 5/5 in all extremities.  Sensory grossly intact.            

      Cerebellar exam normal.  Normal gait.                                                       

                                                                                                  

Vital Signs:                                                                                      

12:01  / 88; Pulse 99; Resp 18; Temp 98.5; Pulse Ox 100% ; Weight 56.7 kg; Height 5 ft. vg1 

      9 in. (175.26 cm); Pain 6/10;                                                               

12:34  / 68; Pulse 65; Resp 18; Pulse Ox 100% on R/A;                                   ld1 

13:00  / 74; Pulse 75; Resp 18; Pulse Ox 100% on R/A;                                   ld1 

12:01 Body Mass Index 18.46 (56.70 kg, 175.26 cm)                                             vg1 

                                                                                                  

MDM:                                                                                              

12:20 Differential diagnosis: gastritis, pancreatitis, viral gastroenteritis, gastroenteritis.ma2 

13:58 Data reviewed: vital signs, nurses notes, EMS record. Counseling: I had a detailed      ma2 

      discussion with the patient and/or guardian regarding: the historical points, exam          

      findings, and any diagnostic results supporting the discharge/admit diagnosis, the          

      presence of at least one elevated blood pressure reading (>120/80) during this              

      emergency department visit, the need for outpatient follow up. Response to treatment:       

      the patient's symptoms have markedly improved after treatment.                              

13:59 Patient medically screened.                                                             ma2 

                                                                                                  

10/19                                                                                             

12:12 Order name: Basic Metabolic Panel; Complete Time: 13:56                                 ma2 

10/19                                                                                             

12:12 Order name: CBC with Diff; Complete Time: 13:56                                         ma2 

10/19                                                                                             

12:12 Order name: Hepatic Function; Complete Time: 13:56                                      ma2 

10/19                                                                                             

12:12 Order name: Lipase; Complete Time: 13:56                                                ma2 

10/19                                                                                             

12:44 Order name: Urine Pregnancy--Ancillary (enter results); Complete Time: 13:56            bd  

10/19                                                                                             

12:12 Order name: IV Saline Lock; Complete Time: 12:14                                        ma2 

10/19                                                                                             

12:12 Order name: Labs collected and sent; Complete Time: 12:16                               ma2 

10/19                                                                                             

12:12 Order name: Urine Dipstick-Ancillary (obtain specimen); Complete Time: 12:43            ma2 

                                                                                                  

Administered Medications:                                                                         

12:35 Drug: Zofran (Ondansetron) 4 mg Route: IVP; Site: right antecubital;                    ld1 

13:11 Follow up: Response: No adverse reaction                                                ld1 

12:35 Drug: Pepcid (famotidine) 20 mg Route: IVP; Site: right antecubital;                    ld1 

13:11 Follow up: Response: No adverse reaction                                                ld1 

12:35 Drug: GI Cocktail without Donnatal - (Maalox Suspension 30 ml, Lidocaine Liquid 2 % 15  ld1 

      ml) Route: PO;                                                                              

13:11 Follow up: Response: No adverse reaction                                                ld1 

12:36 Drug: NS 0.9% 1000 ml Route: IV; Rate: 1 bolus; Site: right antecubital;                ld1 

13:11 Drug: Reglan (metoCLOPramide) 10 mg Route: IVP; Site: right antecubital;                ld1 

13:12 Follow up: Response: No adverse reaction                                                ld1 

13:11 Drug: Ketorolac 30 mg Route: IVP; Site: right antecubital;                              ld1 

13:12 Follow up: Response: No adverse reaction                                                ld1 

                                                                                                  

                                                                                                  

Disposition Summary:                                                                              

10/19/21 13:59                                                                                    

Discharge Ordered                                                                                 

      Location: Home                                                                          ma2 

      Condition: Stable                                                                       ma2 

      Diagnosis                                                                                   

        - Nausea                                                                              ma2 

      Followup:                                                                               ma2 

        - With: Private Physician                                                                  

        - When: Tomorrow                                                                           

        - Reason: If symptoms return                                                               

      Discharge Instructions:                                                                     

        - Nausea, Adult                                                                       ma2 

        - Discharge Summary Sheet                                                             ld1 

      Forms:                                                                                      

        - Medication Reconciliation Form                                                      ma2 

        - Thank You Letter                                                                    ma2 

        - Antibiotic Education                                                                ma2 

        - Work release form                                                                   ld1 

        - Prescription Opioid Use                                                             ma2 

      Prescriptions:                                                                              

        - Zofran 4 mg Oral Tablet                                                                  

            - take 1 tablet by ORAL route every 12 hours As needed; 20 tablet; Refills: 0,    ma2 

      Product Selection Permitted                                                                 

Signatures:                                                                                       

Dispatcher [employer]                           EDElodia Naranjo MD MD   ma2                                                  

Angelica Chase RN                    RN   vg1                                                  

Carol Wolfe RN                     RN   ld1                                                  

                                                                                                  

**************************************************************************************************

## 2021-10-19 NOTE — ER
Nurse's Notes                                                                                     

 [facility]                                                                 

Name: Sierra Doss                                                                                

Age: 26 yrs                                                                                       

Sex: Female                                                                                       

: 1995                                                                                   

MRN: [id number]                                                                                   

Arrival Date: 10/19/2021                                                                          

Time: 11:39                                                                                       

Account#: [id number]                                                                            

Bed 24                                                                                            

Private MD:                                                                                       

Diagnosis: Nausea                                                                                 

                                                                                                  

Presentation:                                                                                     

10/19                                                                                             

12:01 Chief complaint: Patient states: For the past two weeks patient states has been have NV vg1 

      that is intermittently but states the past two days has become worse. States headache       

      and eye pressure and is feeling dizzy. States has hx of gastroenteritis. States has         

      lost about 20 lbs in the past two months and has not been trying to lose weight.            

      Coronavirus screen: Vaccine status: Patient reports being unvaccinated. Client denies       

      travel out of the [place] in the last 14 days. Client presents with at least one sign or       

      symptom that may indicate coronavirus-19. Standard/surgical mask placed on the client.      

      Ebola Screen: Patient negative for fever greater than or equal to 101.5 degrees             

      Fahrenheit, and additional compatible Ebola Virus Disease symptoms. Initial Sepsis          

      Screen: Does the patient meet any 2 criteria? No. Patient's initial sepsis screen is        

      negative. Does the patient have a suspected source of infection? No. Patient's initial      

      sepsis screen is negative. Risk Assessment: Do you want to hurt yourself or someone         

      else? Patient reports no desire to harm self or others. Onset of symptoms was 2021.                                                                                   

12:01 Method Of Arrival: Ambulatory                                                           vg1 

12:01 Acuity: VICKY 3                                                                           vg1 

                                                                                                  

Triage Assessment:                                                                                

12:04 General: Appears in no apparent distress. uncomfortable, Behavior is calm, cooperative. vg1 

      Pain: Complains of pain in epigastric area, head and TIFFANIE eyes. GI: Reports nausea,          

      vomiting.                                                                                   

                                                                                                  

OB/GYN:                                                                                           

12:04 LMP 10/11/2021                                                                          vg1 

                                                                                                  

Historical:                                                                                       

- Allergies:                                                                                      

12:04 PENICILLINS;                                                                            vg1 

- Home Meds:                                                                                      

12:04 Prozac Oral [Active]; Propranolol Oral [Active];                                        vg1 

- PMHx:                                                                                           

12:04 Seizures; chronic gastroenteritis;                                                      vg1 

                                                                                                  

- Immunization history:: Adult Immunizations up to date, Client reports having NOT                

  received the Covid vaccine.                                                                     

- Social history:: Smoking status: Patient/guardian denies using tobacco, Stopped _               

  months ago 1.                                                                                   

- Family history:: not pertinent.                                                                 

                                                                                                  

                                                                                                  

Screenin:00 Abuse screen: Denies threats or abuse. Denies injuries from another. Nutritional        ld1 

      screening: No deficits noted. Tuberculosis screening: No symptoms or risk factors           

      identified. Fall Risk None identified.                                                      

                                                                                                  

Assessment:                                                                                       

13:09 General: Appears in no apparent distress. uncomfortable, Behavior is calm, cooperative, ld1 

      appropriate for age. Pain: Complains of pain in face Pain does not radiate. Pain            

      currently is 9 out of 10 on a pain scale. Quality of pain is described as throbbing,        

      Pain began 1 day ago. Is continuous. Neuro: Level of Consciousness is awake, alert,         

      obeys commands, Oriented to person, place, time, situation. Cardiovascular: Capillary       

      refill < 3 seconds Patient's skin is warm and dry. Rhythm is regular. Respiratory:          

      Airway is patent Respiratory effort is even, unlabored, Respiratory pattern is regular,     

      symmetrical. GI: Abdomen is flat, non-distended. GI: Reports nausea, vomiting. : No       

      signs and/or symptoms were reported regarding the genitourinary system. EENT: No signs      

      and/or symptoms were reported regarding the EENT system. Derm: No signs and/or symptoms     

      reported regarding the dermatologic system. Musculoskeletal: No signs and/or symptoms       

      reported regarding the musculoskeletal system.                                              

                                                                                                  

Vital Signs:                                                                                      

12:01  / 88; Pulse 99; Resp 18; Temp 98.5; Pulse Ox 100% ; Weight 56.7 kg; Height 5 ft. vg1 

      9 in. (175.26 cm); Pain 6/10;                                                               

12:34  / 68; Pulse 65; Resp 18; Pulse Ox 100% on R/A;                                   ld1 

13:00  / 74; Pulse 75; Resp 18; Pulse Ox 100% on R/A;                                   ld1 

12:01 Body Mass Index 18.46 (56.70 kg, 175.26 cm)                                             vg1 

                                                                                                  

ED Course:                                                                                        

11:39 Patient arrived in ED.                                                                  ds1 

12:04 Triage completed.                                                                       vg1 

12:04 Arm band placed on.                                                                     vg1 

12:12 Elodia Meza MD is Attending Physician.                                           ma2 

12:14 Inserted saline lock: 20 gauge in right antecubital area, using aseptic technique.      vg1 

      ,using aseptic technique. at bedside Blood collected.                                       

12:16 Carol Wolfe RN is Primary Nurse.                                                   ld1 

13:00 Patient has correct armband on for positive identification. Placed in gown. Bed in low  ld1 

      position. Call light in reach. Side rails up X2. Cardiac monitor on. Pulse ox on. NIBP      

      on. Door closed. Noise minimized. Warm blanket given.                                       

13:00 No provider procedures requiring assistance completed.                                  ld1 

14:04 IV discontinued, intact, bleeding controlled, No redness/swelling at site.              ld1 

                                                                                                  

Administered Medications:                                                                         

12:35 Drug: Zofran (Ondansetron) 4 mg Route: IVP; Site: right antecubital;                    ld1 

13:11 Follow up: Response: No adverse reaction                                                ld1 

12:35 Drug: Pepcid (famotidine) 20 mg Route: IVP; Site: right antecubital;                    ld1 

13:11 Follow up: Response: No adverse reaction                                                ld1 

12:35 Drug: GI Cocktail without Donnatal - (Maalox Suspension 30 ml, Lidocaine Liquid 2 % 15  ld1 

      ml) Route: PO;                                                                              

13:11 Follow up: Response: No adverse reaction                                                ld1 

12:36 Drug: NS 0.9% 1000 ml Route: IV; Rate: 1 bolus; Site: right antecubital;                ld1 

13:11 Drug: Reglan (metoCLOPramide) 10 mg Route: IVP; Site: right antecubital;                ld1 

13:12 Follow up: Response: No adverse reaction                                                ld1 

13:11 Drug: Ketorolac 30 mg Route: IVP; Site: right antecubital;                              ld1 

13:12 Follow up: Response: No adverse reaction                                                ld1 

                                                                                                  

                                                                                                  

Outcome:                                                                                          

13:59 Discharge ordered by MD.                                                                singh 

14:03 Discharged to home ambulatory.                                                          ld1 

14:03 Condition: stable                                                                           

14:03 Discharge instructions given to patient, Instructed on discharge instructions, follow       

      up and referral plans. medication usage, Demonstrated understanding of instructions,        

      follow-up care, medications, Prescriptions given X 1.                                       

14:04 Patient left the ED.                                                                    ld1 

                                                                                                  

Signatures:                                                                                       

Miryam Brownlee Mohammad, MD MD ma2                                                  

Angelica Chase, RN                    RN   vg1                                                  

Carol Wolfe RN                     RN   ld1                                                  

                                                                                                  

**************************************************************************************************